# Patient Record
Sex: FEMALE | Race: WHITE | NOT HISPANIC OR LATINO | URBAN - METROPOLITAN AREA
[De-identification: names, ages, dates, MRNs, and addresses within clinical notes are randomized per-mention and may not be internally consistent; named-entity substitution may affect disease eponyms.]

---

## 2019-09-24 ENCOUNTER — OFFICE (OUTPATIENT)
Dept: URBAN - METROPOLITAN AREA CLINIC 19 | Facility: CLINIC | Age: 74
End: 2019-09-24

## 2019-09-24 VITALS
DIASTOLIC BLOOD PRESSURE: 91 MMHG | HEIGHT: 62 IN | WEIGHT: 135 LBS | HEART RATE: 65 BPM | SYSTOLIC BLOOD PRESSURE: 158 MMHG

## 2019-09-24 DIAGNOSIS — K59.00 CONSTIPATION, UNSPECIFIED: ICD-10-CM

## 2019-09-24 LAB
IMMUNOGLOBULIN A, QN, SERUM: 332 MG/DL (ref 64–422)
TSH: 1.78 UIU/ML (ref 0.45–4.5)

## 2019-09-24 PROCEDURE — 99203 OFFICE O/P NEW LOW 30 MIN: CPT | Performed by: NURSE PRACTITIONER

## 2019-09-24 NOTE — SERVICEHPINOTES
Mrs. Thomas presents with a change in her bowel habits.  For more than a year, she has had constipation. She can go 7 or 8 days without a bowel movement.  When she will have a bowel movement, it is very productive and large.  She has tried taking Colace and MiraLax, but they do not help enough.  She denies any precipitating factors.  Her weight has been the same.  Or with the constipation, she has had bloating but she denies abdominal pain, hematochezia, or melena.  In 2007, we 1st saw her for constipation and she had a normal colonoscopy.  In 2015, we saw her for diarrhea after having her gallbladder removed.  Biopsies on her colonoscopy were positive for granulomatous inflammation.  [facility] testing showed ulcerative colitis.  In the past, her celiac blood work was negative, but we will check her serum IgA while she is here.  Recently, she has been having issues with her blood pressure and they have been making medication changes to get it under control.

## 2019-09-24 NOTE — SERVICENOTES
The patient's assessment was discussed face to face with Dr. Gamboa and a collaborative plan of care was established.

## 2021-07-26 ENCOUNTER — OFFICE (OUTPATIENT)
Dept: URBAN - METROPOLITAN AREA CLINIC 19 | Facility: CLINIC | Age: 76
End: 2021-07-26

## 2021-07-26 VITALS
OXYGEN SATURATION: 97 % | HEIGHT: 62 IN | HEART RATE: 67 BPM | SYSTOLIC BLOOD PRESSURE: 128 MMHG | DIASTOLIC BLOOD PRESSURE: 68 MMHG | WEIGHT: 132 LBS

## 2021-07-26 DIAGNOSIS — K92.1 MELENA: ICD-10-CM

## 2021-07-26 DIAGNOSIS — R63.5 ABNORMAL WEIGHT GAIN: ICD-10-CM

## 2021-07-26 DIAGNOSIS — K59.00 CONSTIPATION, UNSPECIFIED: ICD-10-CM

## 2021-07-26 LAB
CBC, PLATELET, NO DIFFERENTIAL: HEMATOCRIT: 38.4 % (ref 34–46.6)
CBC, PLATELET, NO DIFFERENTIAL: HEMOGLOBIN: 12.4 G/DL (ref 11.1–15.9)
CBC, PLATELET, NO DIFFERENTIAL: MCH: 30.5 PG (ref 26.6–33)
CBC, PLATELET, NO DIFFERENTIAL: MCHC: 32.3 G/DL (ref 31.5–35.7)
CBC, PLATELET, NO DIFFERENTIAL: MCV: 95 FL (ref 79–97)
CBC, PLATELET, NO DIFFERENTIAL: NRBC: (no result)
CBC, PLATELET, NO DIFFERENTIAL: PLATELETS: 220 X10E3/UL (ref 150–450)
CBC, PLATELET, NO DIFFERENTIAL: RBC: 4.06 X10E6/UL (ref 3.77–5.28)
CBC, PLATELET, NO DIFFERENTIAL: RDW: 12.6 % (ref 11.7–15.4)
CBC, PLATELET, NO DIFFERENTIAL: WBC: 9.8 X10E3/UL (ref 3.4–10.8)
THYROID CASCADE PROFILE: TSH: 2.28 UIU/ML (ref 0.45–4.5)

## 2021-07-26 PROCEDURE — 99204 OFFICE O/P NEW MOD 45 MIN: CPT

## 2021-07-26 RX ORDER — LINACLOTIDE 72 UG/1
CAPSULE, GELATIN COATED ORAL
Qty: 30 | Refills: 11 | Status: ACTIVE
Start: 2021-07-26

## 2021-07-26 RX ORDER — POLYETHYLENE GLYCOL 3350, SODIUM SULFATE, SODIUM CHLORIDE, POTASSIUM CHLORIDE, ASCORBIC ACID, SODIUM ASCORBATE 140-9-5.2G
KIT ORAL
Qty: 1 | Refills: 0 | Status: COMPLETED
Start: 2021-07-26 | End: 2021-08-05

## 2021-07-26 RX ORDER — HYDROCORTISONE 25 MG/G
CREAM TOPICAL
Qty: 1 | Refills: 1 | Status: ACTIVE
Start: 2021-07-26

## 2021-07-26 NOTE — SERVICEHPINOTES
A very pleasant 74 y/o female, prior patient of Dr. Gamboa, who presents for evaluation of chronic constipation.  She has a longstanding history of chronic constipation for years, though admits it has been worse over the past 6 months.  She currently has a bowel movement about once a week.  She states that she will take Colace daily for up to several days once she feels uncomfortable and bloated, and if there is no improvement she will take Dulcolax.  There has been episodes of fecal impaction where she has manually needed to disimpact herself.  During these episodes she has seen bright red blood per rectum. She states she has unintentional weight gain of about 10 lb in the past 2 months.  By history, she consumes inadequate water and fiber.  Several years ago she took Linzess 72 mcg and believes it was helpful.Her last colonoscopy 10/15/2015 showed a normal appearing colon. At that time she was having rectal urgency and diarrhea and so biopsies were done with findings of acute focal colitis as well as a granuloma. She had negative RPR, Tb quant gold, and ACE level.  IBD serology was in a pattern suggestive of ulcerative colitis. She denies family history of colon cancer or polyps.      .

## 2021-07-26 NOTE — SERVICENOTES
I discussed the role of fiber, fluid, and stool softeners for optimal control of constipation.  If she has suboptimal improvement of constipation, I recommended she start on low-dose Linzess.  While the pattern of rectal bleeding is suggestive of hemorrhoids, since her last colonoscopy was approximately 6 years ago, I recommended a colonoscopy now.  I discussed the procedure as well as risk versus benefits, risks including bleeding, perforation, splenic injury, aspiration, complication from sedation and she is agreeable to proceed. I recommended checking a thyroid profile to assess for hypothyroidism as well as CBC.

## 2021-08-05 ENCOUNTER — AMBULATORY SURGICAL CENTER (OUTPATIENT)
Dept: URBAN - METROPOLITAN AREA SURGERY 3 | Facility: SURGERY | Age: 76
End: 2021-08-05

## 2021-08-05 ENCOUNTER — OFFICE (OUTPATIENT)
Dept: URBAN - METROPOLITAN AREA PATHOLOGY 22 | Facility: PATHOLOGY | Age: 76
End: 2021-08-05

## 2021-08-05 ENCOUNTER — AMBULATORY SURGICAL CENTER (OUTPATIENT)
Dept: URBAN - METROPOLITAN AREA SURGERY 3 | Facility: SURGERY | Age: 76
End: 2021-08-05
Payer: MEDICARE

## 2021-08-05 VITALS
SYSTOLIC BLOOD PRESSURE: 109 MMHG | SYSTOLIC BLOOD PRESSURE: 147 MMHG | DIASTOLIC BLOOD PRESSURE: 69 MMHG | DIASTOLIC BLOOD PRESSURE: 92 MMHG | SYSTOLIC BLOOD PRESSURE: 119 MMHG | HEART RATE: 71 BPM | HEART RATE: 70 BPM | HEIGHT: 62 IN | HEART RATE: 80 BPM | RESPIRATION RATE: 20 BRPM | OXYGEN SATURATION: 100 % | RESPIRATION RATE: 16 BRPM | DIASTOLIC BLOOD PRESSURE: 92 MMHG | SYSTOLIC BLOOD PRESSURE: 95 MMHG | WEIGHT: 130 LBS | SYSTOLIC BLOOD PRESSURE: 95 MMHG | WEIGHT: 130 LBS | HEART RATE: 66 BPM | OXYGEN SATURATION: 98 % | RESPIRATION RATE: 24 BRPM | TEMPERATURE: 98.5 F | SYSTOLIC BLOOD PRESSURE: 95 MMHG | SYSTOLIC BLOOD PRESSURE: 109 MMHG | DIASTOLIC BLOOD PRESSURE: 62 MMHG | RESPIRATION RATE: 14 BRPM | SYSTOLIC BLOOD PRESSURE: 122 MMHG | TEMPERATURE: 97 F | HEART RATE: 79 BPM | HEART RATE: 80 BPM | SYSTOLIC BLOOD PRESSURE: 147 MMHG | DIASTOLIC BLOOD PRESSURE: 69 MMHG | RESPIRATION RATE: 16 BRPM | OXYGEN SATURATION: 100 % | DIASTOLIC BLOOD PRESSURE: 62 MMHG | OXYGEN SATURATION: 100 % | TEMPERATURE: 97 F | SYSTOLIC BLOOD PRESSURE: 119 MMHG | DIASTOLIC BLOOD PRESSURE: 69 MMHG | DIASTOLIC BLOOD PRESSURE: 63 MMHG | SYSTOLIC BLOOD PRESSURE: 119 MMHG | SYSTOLIC BLOOD PRESSURE: 147 MMHG | WEIGHT: 130 LBS | HEART RATE: 80 BPM | HEART RATE: 66 BPM | OXYGEN SATURATION: 98 % | RESPIRATION RATE: 20 BRPM | OXYGEN SATURATION: 98 % | HEART RATE: 70 BPM | HEART RATE: 70 BPM | RESPIRATION RATE: 20 BRPM | HEART RATE: 79 BPM | TEMPERATURE: 97 F | HEART RATE: 66 BPM | RESPIRATION RATE: 16 BRPM | DIASTOLIC BLOOD PRESSURE: 61 MMHG | DIASTOLIC BLOOD PRESSURE: 63 MMHG | TEMPERATURE: 98.5 F | DIASTOLIC BLOOD PRESSURE: 62 MMHG | RESPIRATION RATE: 24 BRPM | SYSTOLIC BLOOD PRESSURE: 109 MMHG | HEIGHT: 62 IN | RESPIRATION RATE: 24 BRPM | RESPIRATION RATE: 14 BRPM | HEART RATE: 79 BPM | HEART RATE: 71 BPM | SYSTOLIC BLOOD PRESSURE: 122 MMHG | SYSTOLIC BLOOD PRESSURE: 122 MMHG | HEART RATE: 71 BPM | DIASTOLIC BLOOD PRESSURE: 61 MMHG | DIASTOLIC BLOOD PRESSURE: 63 MMHG | HEIGHT: 62 IN | DIASTOLIC BLOOD PRESSURE: 92 MMHG | TEMPERATURE: 98.5 F | RESPIRATION RATE: 14 BRPM | DIASTOLIC BLOOD PRESSURE: 61 MMHG

## 2021-08-05 DIAGNOSIS — D12.2 BENIGN NEOPLASM OF ASCENDING COLON: ICD-10-CM

## 2021-08-05 DIAGNOSIS — K64.4 RESIDUAL HEMORRHOIDAL SKIN TAGS: ICD-10-CM

## 2021-08-05 DIAGNOSIS — K92.1 MELENA: ICD-10-CM

## 2021-08-05 PROBLEM — K63.5 POLYP OF COLON: Status: ACTIVE | Noted: 2021-08-05

## 2021-08-05 PROCEDURE — 45380 COLONOSCOPY AND BIOPSY: CPT | Performed by: INTERNAL MEDICINE

## 2021-08-05 PROCEDURE — 88305 TISSUE EXAM BY PATHOLOGIST: CPT | Performed by: INTERNAL MEDICINE

## 2021-08-05 PROCEDURE — G8907 PT DOC NO EVENTS ON DISCHARG: HCPCS | Performed by: INTERNAL MEDICINE

## 2021-08-05 PROCEDURE — G8918 PT W/O PREOP ORDER IV AB PRO: HCPCS | Performed by: INTERNAL MEDICINE
